# Patient Record
Sex: FEMALE | Race: WHITE | NOT HISPANIC OR LATINO | URBAN - METROPOLITAN AREA
[De-identification: names, ages, dates, MRNs, and addresses within clinical notes are randomized per-mention and may not be internally consistent; named-entity substitution may affect disease eponyms.]

---

## 2017-01-01 ENCOUNTER — OFFICE VISIT - HEALTHEAST (OUTPATIENT)
Dept: FAMILY MEDICINE | Facility: CLINIC | Age: 27
End: 2017-01-01

## 2017-01-01 ENCOUNTER — COMMUNICATION - HEALTHEAST (OUTPATIENT)
Dept: FAMILY MEDICINE | Facility: CLINIC | Age: 27
End: 2017-01-01

## 2017-01-01 ENCOUNTER — COMMUNICATION - HEALTHEAST (OUTPATIENT)
Dept: NURSING | Facility: CLINIC | Age: 27
End: 2017-01-01

## 2017-01-01 ENCOUNTER — COMMUNICATION - HEALTHEAST (OUTPATIENT)
Dept: TELEHEALTH | Facility: CLINIC | Age: 27
End: 2017-01-01

## 2017-01-01 ENCOUNTER — RECORDS - HEALTHEAST (OUTPATIENT)
Dept: ADMINISTRATIVE | Facility: OTHER | Age: 27
End: 2017-01-01

## 2017-01-01 ENCOUNTER — AMBULATORY - HEALTHEAST (OUTPATIENT)
Dept: NURSING | Facility: CLINIC | Age: 27
End: 2017-01-01

## 2017-01-01 ENCOUNTER — AMBULATORY - HEALTHEAST (OUTPATIENT)
Dept: CARE COORDINATION | Facility: CLINIC | Age: 27
End: 2017-01-01

## 2017-01-01 DIAGNOSIS — G40.909 EPILEPSY (H): ICD-10-CM

## 2017-01-01 DIAGNOSIS — F31.9 BIPOLAR AFFECTIVE DISORDER (H): ICD-10-CM

## 2017-01-01 DIAGNOSIS — Z00.00 HEALTH CARE MAINTENANCE: ICD-10-CM

## 2017-01-01 DIAGNOSIS — R63.4 WEIGHT LOSS: ICD-10-CM

## 2017-01-01 DIAGNOSIS — F11.10 HEROIN ABUSE (H): ICD-10-CM

## 2017-01-01 DIAGNOSIS — F19.20 CHEMICAL DEPENDENCY (H): ICD-10-CM

## 2017-01-01 DIAGNOSIS — F25.0 SCHIZOAFFECTIVE DISORDER, BIPOLAR TYPE (H): ICD-10-CM

## 2017-01-01 DIAGNOSIS — J45.31 MILD PERSISTENT ASTHMA WITH ACUTE EXACERBATION: ICD-10-CM

## 2017-01-01 DIAGNOSIS — J45.41 MODERATE PERSISTENT ASTHMA WITH ACUTE EXACERBATION: ICD-10-CM

## 2017-01-01 LAB
BKR LAB AP ABNORMAL BLEEDING: NO
BKR LAB AP BIRTH CONTROL/HORMONES: ABNORMAL
BKR LAB AP CERVICAL APPEARANCE: NORMAL
BKR LAB AP GYN ADEQUACY: ABNORMAL
BKR LAB AP GYN INTERPRETATION: ABNORMAL
BKR LAB AP GYN OTHER FINDINGS: ABNORMAL
BKR LAB AP HPV REFLEX: ABNORMAL
BKR LAB AP LMP: ABNORMAL
BKR LAB AP PATIENT STATUS: ABNORMAL
BKR LAB AP PREVIOUS ABNORMAL: ABNORMAL
BKR LAB AP PREVIOUS NORMAL: ABNORMAL
HIGH RISK?: NO
PATH REPORT.COMMENTS IMP SPEC: ABNORMAL
RESULT FLAG (HE HISTORICAL CONVERSION): ABNORMAL

## 2017-01-01 RX ORDER — FOLIC ACID 1 MG/1
1 TABLET ORAL DAILY
Qty: 30 TABLET | Refills: 11 | Status: SHIPPED | OUTPATIENT
Start: 2017-01-01

## 2017-01-01 RX ORDER — ARIPIPRAZOLE 30 MG/1
30 TABLET ORAL DAILY
Qty: 30 TABLET | Refills: 2 | Status: SHIPPED | OUTPATIENT
Start: 2017-01-01

## 2017-01-01 RX ORDER — RIBOFLAVIN (VITAMIN B2) 100 MG
100 TABLET ORAL DAILY
Qty: 30 TABLET | Refills: 11 | Status: SHIPPED | OUTPATIENT
Start: 2017-01-01

## 2017-01-01 RX ORDER — BUDESONIDE AND FORMOTEROL FUMARATE DIHYDRATE 160; 4.5 UG/1; UG/1
2 AEROSOL RESPIRATORY (INHALATION) 2 TIMES DAILY
Qty: 1 INHALER | Refills: 12 | Status: SHIPPED | OUTPATIENT
Start: 2017-01-01

## 2017-01-01 RX ORDER — SERTRALINE HYDROCHLORIDE 100 MG/1
100 TABLET, FILM COATED ORAL DAILY
Qty: 30 TABLET | Refills: 2 | Status: SHIPPED | OUTPATIENT
Start: 2017-01-01

## 2017-01-01 RX ORDER — ALBUTEROL SULFATE 0.83 MG/ML
2.5 SOLUTION RESPIRATORY (INHALATION) EVERY 4 HOURS PRN
Qty: 25 VIAL | Refills: 2 | Status: SHIPPED | OUTPATIENT
Start: 2017-01-01 | End: 2018-08-02

## 2017-01-01 RX ORDER — VALACYCLOVIR HYDROCHLORIDE 1 G/1
TABLET, FILM COATED ORAL
Qty: 4 TABLET | Refills: 2 | Status: SHIPPED | OUTPATIENT
Start: 2017-01-01

## 2017-01-01 RX ORDER — QUETIAPINE 300 MG/1
TABLET, FILM COATED, EXTENDED RELEASE ORAL
Qty: 30 TABLET | Refills: 2 | Status: SHIPPED | OUTPATIENT
Start: 2017-01-01

## 2017-01-01 RX ORDER — BENZTROPINE MESYLATE 2 MG/1
2 TABLET ORAL 3 TIMES DAILY PRN
Qty: 90 TABLET | Refills: 2 | Status: SHIPPED | OUTPATIENT
Start: 2017-01-01

## 2017-01-01 RX ORDER — DIPHENHYDRAMINE HCL 25 MG
25 CAPSULE ORAL EVERY 6 HOURS PRN
Qty: 30 CAPSULE | Refills: 2 | Status: SHIPPED | COMMUNITY
Start: 2017-01-01

## 2017-01-01 RX ORDER — LURASIDONE HYDROCHLORIDE 80 MG/1
80 TABLET, FILM COATED ORAL DAILY
Qty: 30 TABLET | Refills: 2 | Status: SHIPPED | OUTPATIENT
Start: 2017-01-01

## 2017-01-01 RX ORDER — PHENYTOIN SODIUM 100 MG/1
500 CAPSULE, EXTENDED RELEASE ORAL DAILY
Qty: 30 CAPSULE | Refills: 5 | Status: SHIPPED | OUTPATIENT
Start: 2017-01-01

## 2017-01-01 RX ORDER — MAGNESIUM OXIDE 400 MG/1
400 TABLET ORAL DAILY
Qty: 30 TABLET | Refills: 10 | Status: SHIPPED | OUTPATIENT
Start: 2017-01-01

## 2017-01-01 RX ORDER — ALBUTEROL SULFATE 90 UG/1
2 AEROSOL, METERED RESPIRATORY (INHALATION) EVERY 4 HOURS PRN
Qty: 1 INHALER | Refills: 2 | Status: SHIPPED | OUTPATIENT
Start: 2017-01-01

## 2017-01-01 ASSESSMENT — MIFFLIN-ST. JEOR
SCORE: 1362.16
SCORE: 1202.16

## 2017-08-11 ENCOUNTER — COMMUNICATION - HEALTHEAST (OUTPATIENT)
Dept: FAMILY MEDICINE | Facility: CLINIC | Age: 27
End: 2017-08-11

## 2017-08-15 ENCOUNTER — COMMUNICATION - HEALTHEAST (OUTPATIENT)
Dept: NURSING | Facility: CLINIC | Age: 27
End: 2017-08-15

## 2017-08-16 ENCOUNTER — COMMUNICATION - HEALTHEAST (OUTPATIENT)
Dept: NURSING | Facility: CLINIC | Age: 27
End: 2017-08-16

## 2017-08-17 LAB
HPV INTERPRETATION - HISTORICAL: NORMAL
HPV INTERPRETER - HISTORICAL: NORMAL

## 2017-09-01 ENCOUNTER — COMMUNICATION - HEALTHEAST (OUTPATIENT)
Dept: BEHAVIORAL HEALTH | Facility: CLINIC | Age: 27
End: 2017-09-01

## 2017-09-19 ENCOUNTER — RECORDS - HEALTHEAST (OUTPATIENT)
Dept: ADMINISTRATIVE | Facility: OTHER | Age: 27
End: 2017-09-19

## 2021-05-25 ENCOUNTER — RECORDS - HEALTHEAST (OUTPATIENT)
Dept: ADMINISTRATIVE | Facility: CLINIC | Age: 31
End: 2021-05-25

## 2021-05-27 ENCOUNTER — RECORDS - HEALTHEAST (OUTPATIENT)
Dept: ADMINISTRATIVE | Facility: CLINIC | Age: 31
End: 2021-05-27

## 2021-05-30 ENCOUNTER — RECORDS - HEALTHEAST (OUTPATIENT)
Dept: ADMINISTRATIVE | Facility: CLINIC | Age: 31
End: 2021-05-30

## 2021-05-30 VITALS — BODY MASS INDEX: 22.86 KG/M2 | WEIGHT: 126 LBS

## 2021-05-30 VITALS — HEIGHT: 62 IN | BODY MASS INDEX: 27.58 KG/M2 | WEIGHT: 149.9 LBS

## 2021-05-31 VITALS — HEIGHT: 61 IN | WEIGHT: 119 LBS | BODY MASS INDEX: 22.47 KG/M2
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
## 2021-06-11 NOTE — PROGRESS NOTES
6-2-17  Attempted #2  Patient no show to today's appointment 8:30am to meet with clinic  [facility]ina Chowdhury regarding housing and other needs.  No upcoming appointments in clinic at this time.      5-31-17  Patient no show to today's appointment at 11am.  Attempted #1    Reviewed patient appt desk. Patient has an appointment with clinic  (Dina) 6-2-17 at 8:30am.  Will attempt to meet with patient 6-2-17.

## 2021-06-11 NOTE — PROGRESS NOTES
6-21-17  Patient missed RN assessment today at 2pm.  Called and spoke to patient to follow up and reschedule.   Patient forgot and apologized for missing today's appt.   Rescheduled for RN assessment to Monday 6-26-17 at 2pm.  Patient requested a reminder call at day before.   Okay to call 's cell.      Plan:  Reminder call for 6-26-17 appt.

## 2021-06-11 NOTE — PROGRESS NOTES
6-23-17  Called and spoke to patient. Reminded of her RN assessment appointment with RN on Monday 6-26-17 at 2 pm.  Patient confirmed appointment 6--26-17 at 2pm.

## 2021-06-11 NOTE — PROGRESS NOTES
6-14-17  Clinic Care Guide called and spoke to patient  today at the request of the PCP to discuss possible clinic care coordination enrollment.   Clinic care coordination was described to the patient and immediate needs were discussed.   The patient agreed to enrollment in clinic care coordination and future appointments were scheduled for an RN care coordination assessment and an enrollment visit with the primary care physician and care guide.   The patient was provided with contact information for the clinic care guide.  Patient preferred morning appointment.       Immediate help:  -need support to coordinate and resources    RN Assessment scheduled with Estelita Ramirez CCC RN on 6-21-17 at 10 am.    Routed note to PCP and Estelita Ramirez RN.    Plan:  Remind of RN assessment appt 6-21-17 at 10am  Co-visit with RN with patient on 6-21-17 to schedule Goal Setting with PCP      -------------------------------------------------------------------------------------------------------------------------------------------------------------------------    6-2-17  Attempted #2  Patient no show to today's appointment 8:30am to meet with clinic  Dina Chowdhury regarding housing and other needs.  No upcoming appointments in clinic at this time.      5-31-17  Patient no show to today's appointment at 11am.  Attempted #1    Reviewed patient appt desk. Patient has an appointment with clinic  (Dina) 6-2-17 at 8:30am.  Will attempt to meet with patient 6-2-17.

## 2021-06-11 NOTE — PROGRESS NOTES
6-20-17  Called and left voice message with patient reminded of tomorrow appointment 6-21-17 at 10 am at [facility] for RN assessment with Estelita Ramirez RN to enroll in Clinic Care Coordination.    Plan:   co-visit with RN tomorrow to schedule goal setting with PCP.

## 2021-06-11 NOTE — PROGRESS NOTES
6-26-17 Cancel today's RN Assessment.    Called and spoke to patient today to reschedule RN assessment appointment due to RN out of office.   Rescheduled RN assessment to 7-14-17 at 10 am.  Patient requested to see RN sooner if possible and if there is any cancellations to give her a call and schedule appointment.   Stated she lives only 5 min from the clinic.    Plan:   will call to remind of RN assessment.

## 2021-06-11 NOTE — PROGRESS NOTES
6-21-17  Received call from [facility] stated patient cancelled today 10am appointment and to call back to reschedule.  Call 's cell [phone].    Called and spoke to patient. Stated she was 15 min late and that she could not be seen. Patient stated she lived 5 min away from clinic.  Patient requested to reschedule appointment to see RN today in afternoon if possible.   Rescheduled patient to afternoon at 2 pm for RN assessment  Patient confirmed appointment.

## 2021-06-11 NOTE — PROGRESS NOTES
5-31-17  Patient no show to today's appointment at 11am.  Attempted #1    Reviewed patient appt desk. Patient has an appointment with clinic  (Dina) 6-2-17 at 8:30am.  Will attempt to meet with patient 6-2-17.

## 2021-06-12 NOTE — PROGRESS NOTES
"8-15-17  \"Subscriber you have dialed is not inservice.\"  Attempt 1: Care Guide called patient to remind of Goal setting tomorrow 8-16-17 at 1 pm.    If this patient is returning my call, please transfer to [phone].  "

## 2021-06-12 NOTE — PROGRESS NOTES
7-21-17  Called and spoke to patient to reschedule RN Assessment with Estelita Ramirez RN to enroll in CCC.  Patient reported yesterday she had panic attack and couldn't breath because of hot weather. Stated she to the clinic and was not able to be seen.  Stated she has asthma, financial issues and that she is applying for help.  Informed patient that she could go to [facility] in [place] if she can't get in to see the doctor.  Patient stated she could meet with RN today. Stated she could get to the clinic.  Met and consulted with Estelita Ramirez RN okay to schedule for RN at 10:30am  RN okay to schedule.    Scheduled RN assessment 7-21-17 at 10:30am with Estelita Ramirez RN    Met patient in clinic to schedule goal setting appointment with Dr. Soler.  Co-visit with Estelita Ramirez RN for RN assessment.    Patient agreed and goal setting scheduled with Dr. Soler on 8-16-17 at 1pm.    Patient signed OJ to communicate with Trenton Liao at [facility] helping to apply for SSI benefit.  Currently on GA.    Plan:   Remind of goal setting appt with PCP on 8-16-17 at 1pm.

## 2021-06-12 NOTE — PROGRESS NOTES
17  Called and spoke to patient's  emergency contact to inform of today's appointment.  He stated patient passed away 4 days ago at [facility] 17.  Expressed my condolence to patient's family.    Plan  Notify PCP    Patient is  and has been removed from [facility] enrolled patient list.
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
## 2021-06-12 NOTE — PROGRESS NOTES
[facility] Office Visit    Chief Complaint:  Chief Complaint   Patient presents with     med check     patient is taking klonopin 2mg which is not on her med list, refill all meds that are taking     Asthma         Assessment/Plan:  1. Mild persistent asthma with acute exacerbation  Pt needs to stop smoking meth and cocaine and heroin, etc.  Pt is extremely anxious and manic today- cautious about starting oral steroids.  She is currently off all inhalers as far as I can tell.  Recommend starting her inhalers/nebulizers again and coming back for a recheck in 2-3 days with pharmacist- pt did not stay long enough to schedule this appointment.    - albuterol (PROVENTIL) 2.5 mg /3 mL (0.083 %) nebulizer solution; Take 3 mL (2.5 mg total) by nebulization every 4 (four) hours as needed for wheezing.  Dispense: 25 vial; Refill: 2  - albuterol (PROAIR HFA;PROVENTIL HFA;VENTOLIN HFA) 90 mcg/actuation inhaler; Inhale 2 puffs every 4 (four) hours as needed for wheezing.  Dispense: 1 Inhaler; Refill: 2  - budesonide-formoterol (SYMBICORT) 160-4.5 mcg/actuation inhaler; Inhale 2 puffs 2 (two) times a day.  Dispense: 1 Inhaler; Refill: 12    2. Bipolar affective disorder  Restart stated medications and help pt establish care with mental health team.  Pt left before getting anything scheduled.  High risk for STIs- screening today.    - sertraline (ZOLOFT) 100 MG tablet; Take 1 tablet (100 mg total) by mouth daily.  Dispense: 30 tablet; Refill: 2  - QUEtiapine (SEROQUEL XR) 300 MG 24 hr tablet; TAKE ONE TABLET (300MG) BY MOUTH DAILY  Dispense: 30 tablet; Refill: 2  - ARIPiprazole (ABILIFY) 30 MG tablet; Take 1 tablet (30 mg total) by mouth daily.  Dispense: 30 tablet; Refill: 2  - diphenhydrAMINE (BENADRYL) 25 mg capsule; Take 1 capsule (25 mg total) by mouth every 6 (six) hours as needed for itching.  Dispense: 30 capsule; Refill: 2  - folic acid (FOLVITE) 1 MG tablet; Take 1 tablet (1 mg total) by mouth daily.   Dispense: 30 tablet; Refill: 11  - lurasidone (LATUDA) 80 mg Tab; Take 1 tablet (80 mg total) by mouth daily.  Dispense: 30 tablet; Refill: 2  - magnesium oxide (MAG-OX) 400 mg tablet; Take 1 tablet (400 mg total) by mouth daily.  Dispense: 30 tablet; Refill: 10  - phenytoin (DILANTIN) 100 MG ER capsule; Take 5 capsules (500 mg total) by mouth daily.  Dispense: 30 capsule; Refill: 5  - VITAMIN B-2 100 mg Tab; Take 1 tablet (100 mg total) by mouth daily.  Dispense: 30 tablet; Refill: 11  - Ambulatory referral to Psychiatry  - Ambulatory referral to Psychology  - Ambulatory referral to Chemical Dependency  - HIV Antigen/Antibody Screening [place]  - Gynecologic Cytology (PAP Smear)  - Wet Prep, Vaginal  - Chlamydia trachomatis & Neisseria gonorrhoeae, Amplified Detection  - Syphilis Screen, Cascade  - Hepatitis C Antibody (Anti-HCV)  - Hepatitis B Surface Antibody (Anti-HBs) Vaccine Check  - HPV Cascade (PCR)    3. Chemical dependency  Pt states she is open to not using street drugs. She does like using heroin and hates suboxone.  Would be open to methadone if possible.  Pt did not stay long enough to schedule apts as requested.  Drug use is certainly affecting her mental health and asthma.    - Ambulatory referral to Psychiatry  - Ambulatory referral to Psychology  - Ambulatory referral to Chemical Dependency    4. Weight loss  Likely related to her drug use and profound anxiety and mental health issues affecting her appetite.    - Thyroid Cascade    5. Epilepsy  Per pt report- not sure exact history but we have this on her medical problem list since she was a teenager.  Certainly drug use will affect this.  Not sure if pt is taking her medications as prescribed.  No recent f/u with neurology- pt left before apts could be scheduled.    - Ambulatory referral to Neurology    6. Moderate persistent asthma with acute exacerbation  As above    7. Heroin abuse  Pt given rx for naloxone for her  to use in case of  "suspected OD.  Discussed with  how and when to use and to call 911 immediately .  Pt asks if she can use it if her  has an OD suggesting they are both using.    - naloxone 4 mg/actuation Spry; 4 mg into each nostril as needed.  Dispense: 1 Box; Refill: 0      No Follow-up on file.    Patient Education/AVS:  There are no Patient Instructions on file for this visit.    HPI:   Zain Deal is a 26 y.o. female c/o repeat pap smear, short of breath at night- wakes up gasping for air- given medicine at [facility] to help with this.  Bad anxiety.  Worse when she is being rushed.  Taking klonipin 2mg as needed for anxiety and ptsd.  In the past abilify 30mg daily, serequel XR 300mg as needed for sleep, latuda 80mg, cogentin 2mg as needed, sertraline 100mg.  Last use of Meth 1 month ago, no cocaine, last used heroin,     Working with [employer] [facility].  Needs a psychiatrist.      Working with [facility]- needs number for greg linda to get help with her SSDI application.      History summarized from1-2:na  Old Records-1:care everywhere consent signed and records obtained  Radiology tests reviewed-1: na  Lab tests reviewed-1: normal pap smear in 2015 and 2011  Medicine tests reviewed-1: na    Physical Exam:  BP 96/66 (Patient Site: Left Arm, Patient Position: Sitting, Cuff Size: Adult Regular)  Pulse (!) 116  Temp 98.4  F (36.9  C) (Oral)   Resp 20  Ht 5' 1\" (1.549 m)  Wt 119 lb (54 kg)  LMP 07/24/2017 (Approximate)  BMI 22.48 kg/m2 Body mass index is 22.48 kg/(m^2). Patient's last menstrual period was 07/24/2017 (approximate).  Vital signs reviewed  Wt Readings from Last 3 Encounters:   08/02/17 119 lb (54 kg)   05/23/17 126 lb (57.2 kg)   01/26/17 149 lb 14.4 oz (68 kg)     History   Smoking Status     Former Smoker     Quit date: 6/22/2006   Smokeless Tobacco     Never Used     History   Sexual Activity     Sexual activity: Yes     Partners: Male     Birth control/ protection: None     RAMIRO-7 Total: 17 " (10/27/2016 11:00 AM)  PHQ-9 Total Score: 17 (5/23/2017  4:00 PM)  PHQ-2 Total Score: 3 (5/23/2017  4:00 PM)  Depression Follow-up Plan: mental health care assessment (5/23/2017  4:00 PM)  ACT Total Score: 7 (8/2/2017  2:00 PM)    All normal as below except abnormalities include: pt appears anxious and in constant movement.  She is frequent seen rocking.  Able to participate in full exam including pap smear without difficulty.  Pt is fluent in [place]ian and speaks to  in Albanian- he understands English as well.  He seems supportive and holds her hand during the pelvic exam.    Lung exam is tight, insp/exp wheezing throughout.  No crackles heard.    General is a  26 y.o. female sitting comfortably in no apparent distress.   HEENT:  TM are clear bilaterally.  Eye, nasal, oral exam2s within normal   Neck: Supple without lymphadenopathy or thyromegally  CV: Regular rate and rhythm S1S2 without rubs, murmurs or gallops,   Lungs: Clear to auscultation bilaterally  Abd:  +BS, soft NT/ND,  No masses or organomegally  Extremities: Warm, No Edema, 2+ Pedal and radial pulses bilaterally  Skin: No lesions or rashes noted  Neuro/MSK: Able to ambulate around the exam room with equal movement, strength and normal coordination of the upper and lower extremeties symmetrically  GYN:  Normal external genitalia.  Healthy normal vaginal mucosa.  Health appearing cervix.  Testing obtained without pain or difficulty.  Normal bimanual exam.        Results for orders placed or performed in visit on 08/02/17   Wet Prep, Vaginal   Result Value Ref Range    Yeast Result No yeast seen No yeast seen    Trichomonas No Trichomonas seen No Trichomonas seen    Clue Cells, Wet Prep No Clue cells seen No Clue cells seen   Chlamydia trachomatis & Neisseria gonorrhoeae, Amplified Detection   Result Value Ref Range    Chlamydia trachomatis, Amplified Detection Negative Negative    Neisseria gonorrhoeae, Amplified Detection Negative Negative    Gynecologic Cytology (PAP Smear)   Result Value Ref Range    Case Report       Gynecologic Cytology Report                       Case: [id number]                                   Authorizing Provider:  Courtney Soler MD     Collected:           08/02/2017 4647              Ordering Location:     [facility]  Received:            08/02/2017 1452                                     Medicine/OB                                                                  First Screen:          BRIDGER Mcknight                                                                            (ASCP)                                                                       Pathologist:           Bari Cleveland MD                                                       Specimen:    SUREPATH PAP, SCREENING, Endocervical/cervical                                             Interpretation       Atypical squamous cells of undetermined significance    Result Flag Abnormal (!) Normal    Other Findings       Shift in vaginal laura suggestive of bacterial vaginosis    Specimen Adequacy       Satisfactory for evaluation, endocervical/transformation zone component present  Partially obscuring inflammation    HPV Reflex? Yes if ASCUS     HIGH RISK No     LMP/Menopause Date 7/24/17     Abnormal Bleeding No     Pt Status na     Birth Control/Hormones None     Previous Normal/Date 2015 and 2011     Prev Abn Date/Dx before she was 22     Cervical Appearance Normal        ROS:  10 point review of symptoms all negative except as outlined in the HPI above.    Med list and active problem list reviewed and updated as part of this encounter    Current Outpatient Prescriptions on File Prior to Visit   Medication Sig Dispense Refill     PRENATAL VIT #91/FE FUM/FA/DHA (PRENATAL + DHA ORAL) Take 1 each by mouth daily.       No current facility-administered medications on file prior to visit.          Courtney Soler MD    This document  was created using voice recognition software which may contain typographical errors.